# Patient Record
Sex: MALE | Race: WHITE | NOT HISPANIC OR LATINO | URBAN - METROPOLITAN AREA
[De-identification: names, ages, dates, MRNs, and addresses within clinical notes are randomized per-mention and may not be internally consistent; named-entity substitution may affect disease eponyms.]

---

## 2022-06-16 ENCOUNTER — WEB ENCOUNTER (OUTPATIENT)
Dept: URBAN - METROPOLITAN AREA CLINIC 78 | Facility: CLINIC | Age: 62
End: 2022-06-16

## 2022-06-22 ENCOUNTER — OFFICE VISIT (OUTPATIENT)
Dept: URBAN - METROPOLITAN AREA CLINIC 78 | Facility: CLINIC | Age: 62
End: 2022-06-22
Payer: COMMERCIAL

## 2022-06-22 ENCOUNTER — DASHBOARD ENCOUNTERS (OUTPATIENT)
Age: 62
End: 2022-06-22

## 2022-06-22 VITALS
HEIGHT: 74 IN | TEMPERATURE: 98.4 F | BODY MASS INDEX: 23.69 KG/M2 | HEART RATE: 99 BPM | SYSTOLIC BLOOD PRESSURE: 102 MMHG | RESPIRATION RATE: 16 BRPM | WEIGHT: 184.6 LBS | DIASTOLIC BLOOD PRESSURE: 69 MMHG

## 2022-06-22 DIAGNOSIS — K59.4 PROCTALGIA FUGAX: ICD-10-CM

## 2022-06-22 DIAGNOSIS — R55 VASO VAGAL EPISODE: ICD-10-CM

## 2022-06-22 DIAGNOSIS — R09.89 GLOBUS SENSATION: ICD-10-CM

## 2022-06-22 PROCEDURE — 99203 OFFICE O/P NEW LOW 30 MIN: CPT | Performed by: INTERNAL MEDICINE

## 2022-06-22 NOTE — HPI-TODAY'S VISIT:
Patient was referred by Dr. Mckenna White  A copy of this document will be sent to the physician.   Patient presents with history of tighteness of his throat 20 years ago . His doctor put him on Estitalopram . He has had 4-5 episodes in 20 years  Episode :  Drinking something , it gets stuck in the throat and he passes out .    Recently his episode was day after memorial day .  He was in [place] where it happened and [place] was in [place] ...  He went to an emergency room at [facility]   Chest X ray and EKG and all kind sort blood work   Never had cardiology work up  Patient has Protalgia Fugax   Patient being evaluated for his symptoms by Dr. Guy Hull in the past and undergone diagnostic EGD x2.  The exam was more than 7 to 8 years ago.  Based on his evaluation he was given escitalopram to manage her symptoms of both globus sensation and proctalgia fugax.  Last colonoscopy in 2016 : Benign polyp .. mucosal